# Patient Record
Sex: MALE | Race: WHITE | NOT HISPANIC OR LATINO | Employment: OTHER | URBAN - NONMETROPOLITAN AREA
[De-identification: names, ages, dates, MRNs, and addresses within clinical notes are randomized per-mention and may not be internally consistent; named-entity substitution may affect disease eponyms.]

---

## 2017-02-06 ENCOUNTER — HOSPITAL ENCOUNTER (OUTPATIENT)
Dept: GENERAL RADIOLOGY | Facility: HOSPITAL | Age: 52
Discharge: HOME | End: 2017-02-06
Admitting: INTERNAL MEDICINE

## 2017-02-06 ENCOUNTER — TRANSCRIBE ORDERS (OUTPATIENT)
Dept: ADMINISTRATIVE | Facility: HOSPITAL | Age: 52
End: 2017-02-06

## 2017-02-06 DIAGNOSIS — R07.9 CHEST PAIN, UNSPECIFIED TYPE: Primary | ICD-10-CM

## 2017-02-06 PROCEDURE — 71130 X-RAY STRENOCLAVIC JT 3/>VWS: CPT

## 2017-03-08 PROBLEM — M15.0 PRIMARY GENERALIZED (OSTEO)ARTHRITIS: Status: ACTIVE | Noted: 2017-03-08

## 2017-03-08 PROBLEM — M31.6 GIANT CELL ARTERITIS (HCC): Status: ACTIVE | Noted: 2017-03-08

## 2017-03-08 PROBLEM — M06.4 INFLAMMATORY POLYARTHROPATHY (HCC): Status: ACTIVE | Noted: 2017-03-08

## 2017-03-08 PROBLEM — I89.0 LYMPHEDEMA: Status: ACTIVE | Noted: 2017-03-08

## 2017-03-08 RX ORDER — SODIUM CHLORIDE 9 MG/ML
250 INJECTION, SOLUTION INTRAVENOUS ONCE
Status: CANCELLED | OUTPATIENT
Start: 2017-03-10

## 2017-03-08 RX ORDER — ACETAMINOPHEN 500 MG
1000 TABLET ORAL ONCE
Status: CANCELLED | OUTPATIENT
Start: 2017-03-10

## 2017-03-08 RX ORDER — DIPHENHYDRAMINE HCL 25 MG
25 CAPSULE ORAL ONCE
Status: CANCELLED | OUTPATIENT
Start: 2017-06-16

## 2017-03-08 RX ORDER — ACETAMINOPHEN 500 MG
1000 TABLET ORAL ONCE
Status: CANCELLED | OUTPATIENT
Start: 2017-06-16

## 2017-03-08 RX ORDER — SODIUM CHLORIDE 9 MG/ML
250 INJECTION, SOLUTION INTRAVENOUS ONCE
Status: CANCELLED | OUTPATIENT
Start: 2017-06-16

## 2017-03-08 RX ORDER — DIPHENHYDRAMINE HCL 25 MG
25 CAPSULE ORAL ONCE
Status: CANCELLED | OUTPATIENT
Start: 2017-03-10

## 2017-03-10 ENCOUNTER — HOSPITAL ENCOUNTER (OUTPATIENT)
Dept: INFUSION THERAPY | Facility: HOSPITAL | Age: 52
Setting detail: INFUSION SERIES
Discharge: HOME | End: 2017-03-10

## 2017-03-16 ENCOUNTER — APPOINTMENT (OUTPATIENT)
Dept: INFUSION THERAPY | Facility: HOSPITAL | Age: 52
End: 2017-03-16

## 2017-03-27 ENCOUNTER — HOSPITAL ENCOUNTER (OUTPATIENT)
Dept: INFUSION THERAPY | Facility: HOSPITAL | Age: 52
Setting detail: INFUSION SERIES
Discharge: HOME | End: 2017-03-27

## 2017-03-27 VITALS
BODY MASS INDEX: 39.99 KG/M2 | DIASTOLIC BLOOD PRESSURE: 64 MMHG | HEART RATE: 74 BPM | SYSTOLIC BLOOD PRESSURE: 112 MMHG | TEMPERATURE: 96.9 F | OXYGEN SATURATION: 98 % | HEIGHT: 69 IN | RESPIRATION RATE: 20 BRPM | WEIGHT: 270 LBS

## 2017-03-27 DIAGNOSIS — I89.0 LYMPHEDEMA: ICD-10-CM

## 2017-03-27 DIAGNOSIS — M31.6 GIANT CELL ARTERITIS (HCC): ICD-10-CM

## 2017-03-27 DIAGNOSIS — M06.4 INFLAMMATORY POLYARTHROPATHY (HCC): ICD-10-CM

## 2017-03-27 DIAGNOSIS — M15.0 PRIMARY GENERALIZED (OSTEO)ARTHRITIS: ICD-10-CM

## 2017-03-27 PROCEDURE — 96415 CHEMO IV INFUSION ADDL HR: CPT

## 2017-03-27 PROCEDURE — 63710000001 DIPHENHYDRAMINE PER 50 MG: Performed by: INTERNAL MEDICINE

## 2017-03-27 PROCEDURE — 96413 CHEMO IV INFUSION 1 HR: CPT

## 2017-03-27 PROCEDURE — 25010000002 INFLIXIMAB PER 10 MG: Performed by: INTERNAL MEDICINE

## 2017-03-27 RX ORDER — PREDNISONE 10 MG/1
10 TABLET ORAL DAILY
COMMUNITY

## 2017-03-27 RX ORDER — METHOTREXATE 25 MG/ML
50 INJECTION INTRA-ARTERIAL; INTRAMUSCULAR; INTRATHECAL; INTRAVENOUS WEEKLY
COMMUNITY

## 2017-03-27 RX ORDER — ONDANSETRON HYDROCHLORIDE 8 MG/1
TABLET, FILM COATED ORAL EVERY 12 HOURS PRN
COMMUNITY

## 2017-03-27 RX ORDER — SUCRALFATE 1 G/1
1 TABLET ORAL 4 TIMES DAILY
COMMUNITY

## 2017-03-27 RX ORDER — IBUPROFEN 400 MG/1
400 TABLET ORAL EVERY 6 HOURS PRN
COMMUNITY

## 2017-03-27 RX ORDER — ACETAMINOPHEN 500 MG
1000 TABLET ORAL ONCE
Status: COMPLETED | OUTPATIENT
Start: 2017-03-27 | End: 2017-03-27

## 2017-03-27 RX ORDER — SODIUM CHLORIDE 9 MG/ML
250 INJECTION, SOLUTION INTRAVENOUS ONCE
Status: CANCELLED | OUTPATIENT
Start: 2017-04-07

## 2017-03-27 RX ORDER — SODIUM CHLORIDE 9 MG/ML
250 INJECTION, SOLUTION INTRAVENOUS ONCE
Status: COMPLETED | OUTPATIENT
Start: 2017-03-27 | End: 2017-03-27

## 2017-03-27 RX ORDER — DIPHENHYDRAMINE HCL 25 MG
25 CAPSULE ORAL ONCE
Status: CANCELLED | OUTPATIENT
Start: 2017-04-07

## 2017-03-27 RX ORDER — DIPHENHYDRAMINE HCL 25 MG
25 CAPSULE ORAL ONCE
Status: COMPLETED | OUTPATIENT
Start: 2017-03-27 | End: 2017-03-27

## 2017-03-27 RX ORDER — SERTRALINE HYDROCHLORIDE 25 MG/1
25 TABLET, FILM COATED ORAL DAILY
COMMUNITY

## 2017-03-27 RX ORDER — ACETAMINOPHEN 500 MG
1000 TABLET ORAL ONCE
Status: CANCELLED | OUTPATIENT
Start: 2017-04-07

## 2017-03-27 RX ORDER — OMEPRAZOLE 20 MG/1
20 CAPSULE, DELAYED RELEASE ORAL 2 TIMES DAILY
COMMUNITY

## 2017-03-27 RX ORDER — POTASSIUM CHLORIDE 750 MG/1
20 TABLET, EXTENDED RELEASE ORAL 2 TIMES DAILY
COMMUNITY

## 2017-03-27 RX ORDER — MAGNESIUM OXIDE 400 MG/1
400 TABLET ORAL 2 TIMES DAILY
COMMUNITY

## 2017-03-27 RX ORDER — ASPIRIN 81 MG/1
81 TABLET ORAL DAILY
COMMUNITY

## 2017-03-27 RX ORDER — ELETRIPTAN HYDROBROMIDE 40 MG/1
40 TABLET, FILM COATED ORAL ONCE AS NEEDED
COMMUNITY

## 2017-03-27 RX ORDER — TRAZODONE HYDROCHLORIDE 150 MG/1
300 TABLET ORAL
COMMUNITY

## 2017-03-27 RX ORDER — FOLIC ACID 1 MG/1
1 TABLET ORAL DAILY
COMMUNITY

## 2017-03-27 RX ADMIN — SODIUM CHLORIDE 250 ML: 9 INJECTION, SOLUTION INTRAVENOUS at 10:47

## 2017-03-27 RX ADMIN — ACETAMINOPHEN 1000 MG: 500 TABLET, COATED ORAL at 10:08

## 2017-03-27 RX ADMIN — DIPHENHYDRAMINE HYDROCHLORIDE 25 MG: 25 CAPSULE ORAL at 10:08

## 2017-03-27 RX ADMIN — INFLIXIMAB 400 MG: 100 INJECTION, POWDER, LYOPHILIZED, FOR SOLUTION INTRAVENOUS at 10:48

## 2017-03-27 NOTE — PATIENT INSTRUCTIONS
Post Rheumatoid Medications:    Please notify your doctor if you are sick or you have an infection.    Make sure that you call your Rheumatoid Doctor if you plan to have an upcoming surgery.    Call the doctor if you have changes in your medications.    Call your doctor if you are on antibiotics or have a fever.  You will need to reschedule your appointment by calling [phone].    Avoid taking any LIVE vaccines.    Post medications reactions:     Possible reactions:      Itching or rash    Headache    Flu-like symptoms of fever, sore throat or joint aches    You may use Tylenol and Benadryl if not contraindicated by your doctor.    Remember that Benadryl makes you sleepy.  Use cautiously.    Severe reaction: You will need to call 911 or come to the Emergency Room.     Swelling of the throat or neck   Chest tightness   Shortness of breath   Difficulty breathing   Wheezing   Drop in your blood pressure with symptoms of weakness or dizziness

## 2017-04-11 ENCOUNTER — HOSPITAL ENCOUNTER (OUTPATIENT)
Dept: INFUSION THERAPY | Facility: HOSPITAL | Age: 52
Setting detail: INFUSION SERIES
Discharge: HOME | End: 2017-04-11

## 2017-04-11 VITALS
WEIGHT: 260 LBS | HEIGHT: 69 IN | RESPIRATION RATE: 20 BRPM | SYSTOLIC BLOOD PRESSURE: 117 MMHG | TEMPERATURE: 97.6 F | OXYGEN SATURATION: 97 % | DIASTOLIC BLOOD PRESSURE: 69 MMHG | BODY MASS INDEX: 38.51 KG/M2 | HEART RATE: 72 BPM

## 2017-04-11 DIAGNOSIS — M31.6 GIANT CELL ARTERITIS (HCC): ICD-10-CM

## 2017-04-11 DIAGNOSIS — I89.0 LYMPHEDEMA: ICD-10-CM

## 2017-04-11 DIAGNOSIS — M06.4 INFLAMMATORY POLYARTHROPATHY (HCC): ICD-10-CM

## 2017-04-11 DIAGNOSIS — M15.0 PRIMARY GENERALIZED (OSTEO)ARTHRITIS: ICD-10-CM

## 2017-04-11 PROCEDURE — 25010000002 INFLIXIMAB PER 10 MG: Performed by: INTERNAL MEDICINE

## 2017-04-11 PROCEDURE — 96413 CHEMO IV INFUSION 1 HR: CPT

## 2017-04-11 PROCEDURE — 96415 CHEMO IV INFUSION ADDL HR: CPT

## 2017-04-11 PROCEDURE — 63710000001 DIPHENHYDRAMINE PER 50 MG: Performed by: INTERNAL MEDICINE

## 2017-04-11 RX ORDER — ACETAMINOPHEN 500 MG
1000 TABLET ORAL ONCE
Status: CANCELLED | OUTPATIENT
Start: 2017-04-24

## 2017-04-11 RX ORDER — DIPHENHYDRAMINE HCL 25 MG
25 CAPSULE ORAL ONCE
Status: COMPLETED | OUTPATIENT
Start: 2017-04-11 | End: 2017-04-11

## 2017-04-11 RX ORDER — DIPHENHYDRAMINE HCL 25 MG
25 CAPSULE ORAL ONCE
Status: CANCELLED | OUTPATIENT
Start: 2017-04-24

## 2017-04-11 RX ORDER — SODIUM CHLORIDE 9 MG/ML
250 INJECTION, SOLUTION INTRAVENOUS ONCE
Status: COMPLETED | OUTPATIENT
Start: 2017-04-11 | End: 2017-04-11

## 2017-04-11 RX ORDER — SODIUM CHLORIDE 9 MG/ML
250 INJECTION, SOLUTION INTRAVENOUS ONCE
Status: CANCELLED | OUTPATIENT
Start: 2017-04-24

## 2017-04-11 RX ORDER — ACETAMINOPHEN 500 MG
1000 TABLET ORAL ONCE
Status: COMPLETED | OUTPATIENT
Start: 2017-04-11 | End: 2017-04-11

## 2017-04-11 RX ADMIN — INFLIXIMAB 400 MG: 100 INJECTION, POWDER, LYOPHILIZED, FOR SOLUTION INTRAVENOUS at 08:21

## 2017-04-11 RX ADMIN — DIPHENHYDRAMINE HYDROCHLORIDE 25 MG: 25 CAPSULE ORAL at 07:52

## 2017-04-11 RX ADMIN — SODIUM CHLORIDE 250 ML: 9 INJECTION, SOLUTION INTRAVENOUS at 08:20

## 2017-04-11 RX ADMIN — ACETAMINOPHEN 1000 MG: 500 TABLET, COATED ORAL at 07:51

## 2017-04-11 NOTE — PATIENT INSTRUCTIONS
Post Rheumatoid Medications:    Please notify your doctor if you are sick or you have an infection.    Make sure that you call your Rheumatoid Doctor if you plan to have an upcoming surgery.    Call the doctor if you have changes in your medications.    Call your doctor if you are on antibiotics or have a fever.  You will need to reschedule your appointment by calling [phone].    Avoid taking any LIVE vaccines.    Post medications reactions:     Possible reactions:      Itching or rash    Headache    Flu-like symptoms of fever, sore throat or joint aches    You may use Tylenol and Benadryl if not contraindicated by your doctor.    Remember that Benadryl makes you sleepy.  Use cautiously.    Severe reaction: You will need to call 911 or come to the Emergency Room.     Swelling of the throat or neck   Chest tightness   Shortness of breath   Difficulty breathing   Wheezing   Drop in your blood pressure with symptoms of weakness or dizziness

## 2017-05-04 ENCOUNTER — HOSPITAL ENCOUNTER (EMERGENCY)
Facility: HOSPITAL | Age: 52
Discharge: HOME | End: 2017-05-04
Attending: EMERGENCY MEDICINE | Admitting: EMERGENCY MEDICINE

## 2017-05-04 ENCOUNTER — APPOINTMENT (OUTPATIENT)
Dept: GENERAL RADIOLOGY | Facility: HOSPITAL | Age: 52
End: 2017-05-04

## 2017-05-04 VITALS
HEART RATE: 60 BPM | OXYGEN SATURATION: 97 % | RESPIRATION RATE: 18 BRPM | BODY MASS INDEX: 37.77 KG/M2 | WEIGHT: 255 LBS | SYSTOLIC BLOOD PRESSURE: 122 MMHG | TEMPERATURE: 98.1 F | DIASTOLIC BLOOD PRESSURE: 80 MMHG | HEIGHT: 69 IN

## 2017-05-04 DIAGNOSIS — R07.9 CHEST PAIN, UNSPECIFIED TYPE: Primary | ICD-10-CM

## 2017-05-04 LAB
ALBUMIN SERPL-MCNC: 4.1 G/DL (ref 3.5–5)
ALBUMIN/GLOB SERPL: 1.2 G/DL (ref 1–2)
ALP SERPL-CCNC: 76 U/L (ref 38–126)
ALT SERPL W P-5'-P-CCNC: 41 U/L (ref 13–69)
ANION GAP SERPL CALCULATED.3IONS-SCNC: 15.1 MMOL/L
AST SERPL-CCNC: 28 U/L (ref 15–46)
BASOPHILS # BLD AUTO: 0.02 10*3/MM3 (ref 0–0.2)
BASOPHILS NFR BLD AUTO: 0.3 % (ref 0–2.5)
BILIRUB SERPL-MCNC: 0.5 MG/DL (ref 0.2–1.3)
BUN BLD-MCNC: 20 MG/DL (ref 7–20)
BUN/CREAT SERPL: 25 (ref 6.3–21.9)
CALCIUM SPEC-SCNC: 9.2 MG/DL (ref 8.4–10.2)
CHLORIDE SERPL-SCNC: 110 MMOL/L (ref 98–107)
CK MB SERPL-CCNC: 0.71 NG/ML (ref 0.2–2.4)
CK MB SERPL-CCNC: 1.04 NG/ML (ref 0.2–2.4)
CK MB SERPL-RTO: 1.2 % (ref 0–2)
CK MB SERPL-RTO: 1.7 % (ref 0–2)
CK SERPL-CCNC: 59 U/L (ref 30–170)
CK SERPL-CCNC: 61 U/L (ref 30–170)
CO2 SERPL-SCNC: 23 MMOL/L (ref 26–30)
CREAT BLD-MCNC: 0.8 MG/DL (ref 0.6–1.3)
DEPRECATED RDW RBC AUTO: 49.1 FL (ref 37–54)
EOSINOPHIL # BLD AUTO: 0.08 10*3/MM3 (ref 0–0.7)
EOSINOPHIL NFR BLD AUTO: 1.2 % (ref 0–7)
ERYTHROCYTE [DISTWIDTH] IN BLOOD BY AUTOMATED COUNT: 13.8 % (ref 11.5–14.5)
GFR SERPL CREATININE-BSD FRML MDRD: 102 ML/MIN/1.73
GLOBULIN UR ELPH-MCNC: 3.3 GM/DL
GLUCOSE BLD-MCNC: 76 MG/DL (ref 74–98)
HCT VFR BLD AUTO: 42.5 % (ref 42–52)
HGB BLD-MCNC: 13.9 G/DL (ref 14–18)
HOLD SPECIMEN: NORMAL
HOLD SPECIMEN: NORMAL
IMM GRANULOCYTES # BLD: 0.01 10*3/MM3 (ref 0–0.06)
IMM GRANULOCYTES NFR BLD: 0.1 % (ref 0–0.6)
LYMPHOCYTES # BLD AUTO: 1.34 10*3/MM3 (ref 0.6–3.4)
LYMPHOCYTES NFR BLD AUTO: 19.6 % (ref 10–50)
MAGNESIUM SERPL-MCNC: 1.9 MG/DL (ref 1.6–2.3)
MCH RBC QN AUTO: 31.7 PG (ref 27–31)
MCHC RBC AUTO-ENTMCNC: 32.7 G/DL (ref 30–37)
MCV RBC AUTO: 96.8 FL (ref 80–94)
MONOCYTES # BLD AUTO: 0.68 10*3/MM3 (ref 0–0.9)
MONOCYTES NFR BLD AUTO: 9.9 % (ref 0–12)
NEUTROPHILS # BLD AUTO: 4.71 10*3/MM3 (ref 2–6.9)
NEUTROPHILS NFR BLD AUTO: 68.9 % (ref 37–80)
NRBC BLD MANUAL-RTO: 0 /100 WBC (ref 0–0)
PLATELET # BLD AUTO: 155 10*3/MM3 (ref 130–400)
PMV BLD AUTO: 12.2 FL (ref 6–12)
POTASSIUM BLD-SCNC: 4.1 MMOL/L (ref 3.5–5.1)
PROT SERPL-MCNC: 7.4 G/DL (ref 6.3–8.2)
RBC # BLD AUTO: 4.39 10*6/MM3 (ref 4.7–6.1)
SODIUM BLD-SCNC: 144 MMOL/L (ref 137–145)
TROPONIN I SERPL-MCNC: 0 NG/ML (ref 0–0.05)
TROPONIN I SERPL-MCNC: <0.012 NG/ML (ref 0–0.03)
TROPONIN I SERPL-MCNC: <0.012 NG/ML (ref 0–0.03)
WBC NRBC COR # BLD: 6.84 10*3/MM3 (ref 4.8–10.8)
WHOLE BLOOD HOLD SPECIMEN: NORMAL
WHOLE BLOOD HOLD SPECIMEN: NORMAL

## 2017-05-04 PROCEDURE — 93005 ELECTROCARDIOGRAM TRACING: CPT | Performed by: EMERGENCY MEDICINE

## 2017-05-04 PROCEDURE — 80053 COMPREHEN METABOLIC PANEL: CPT | Performed by: EMERGENCY MEDICINE

## 2017-05-04 PROCEDURE — 82553 CREATINE MB FRACTION: CPT | Performed by: EMERGENCY MEDICINE

## 2017-05-04 PROCEDURE — 82550 ASSAY OF CK (CPK): CPT | Performed by: EMERGENCY MEDICINE

## 2017-05-04 PROCEDURE — 84484 ASSAY OF TROPONIN QUANT: CPT | Performed by: EMERGENCY MEDICINE

## 2017-05-04 PROCEDURE — 85025 COMPLETE CBC W/AUTO DIFF WBC: CPT | Performed by: EMERGENCY MEDICINE

## 2017-05-04 PROCEDURE — 25010000002 MORPHINE PER 10 MG: Performed by: EMERGENCY MEDICINE

## 2017-05-04 PROCEDURE — 25010000002 ONDANSETRON PER 1 MG: Performed by: EMERGENCY MEDICINE

## 2017-05-04 PROCEDURE — 96374 THER/PROPH/DIAG INJ IV PUSH: CPT

## 2017-05-04 PROCEDURE — 99284 EMERGENCY DEPT VISIT MOD MDM: CPT

## 2017-05-04 PROCEDURE — 71010 HC CHEST PA OR AP: CPT

## 2017-05-04 PROCEDURE — 83735 ASSAY OF MAGNESIUM: CPT | Performed by: EMERGENCY MEDICINE

## 2017-05-04 PROCEDURE — 96375 TX/PRO/DX INJ NEW DRUG ADDON: CPT

## 2017-05-04 PROCEDURE — 96361 HYDRATE IV INFUSION ADD-ON: CPT

## 2017-05-04 RX ORDER — ALUMINA, MAGNESIA, AND SIMETHICONE 2400; 2400; 240 MG/30ML; MG/30ML; MG/30ML
15 SUSPENSION ORAL ONCE
Status: COMPLETED | OUTPATIENT
Start: 2017-05-04 | End: 2017-05-04

## 2017-05-04 RX ORDER — SODIUM CHLORIDE 0.9 % (FLUSH) 0.9 %
10 SYRINGE (ML) INJECTION AS NEEDED
Status: DISCONTINUED | OUTPATIENT
Start: 2017-05-04 | End: 2017-05-04 | Stop reason: HOSPADM

## 2017-05-04 RX ORDER — SODIUM CHLORIDE 9 MG/ML
125 INJECTION, SOLUTION INTRAVENOUS CONTINUOUS
Status: DISCONTINUED | OUTPATIENT
Start: 2017-05-04 | End: 2017-05-04 | Stop reason: HOSPADM

## 2017-05-04 RX ORDER — ONDANSETRON 2 MG/ML
4 INJECTION INTRAMUSCULAR; INTRAVENOUS ONCE
Status: COMPLETED | OUTPATIENT
Start: 2017-05-04 | End: 2017-05-04

## 2017-05-04 RX ORDER — MORPHINE SULFATE 4 MG/ML
4 INJECTION, SOLUTION INTRAMUSCULAR; INTRAVENOUS ONCE
Status: COMPLETED | OUTPATIENT
Start: 2017-05-04 | End: 2017-05-04

## 2017-05-04 RX ORDER — ASPIRIN 325 MG
325 TABLET ORAL ONCE
Status: COMPLETED | OUTPATIENT
Start: 2017-05-04 | End: 2017-05-04

## 2017-05-04 RX ORDER — FAMOTIDINE 10 MG/ML
20 INJECTION, SOLUTION INTRAVENOUS ONCE
Status: COMPLETED | OUTPATIENT
Start: 2017-05-04 | End: 2017-05-04

## 2017-05-04 RX ADMIN — LIDOCAINE HYDROCHLORIDE 15 ML: 20 SOLUTION ORAL; TOPICAL at 18:00

## 2017-05-04 RX ADMIN — ONDANSETRON 4 MG: 2 INJECTION INTRAMUSCULAR; INTRAVENOUS at 16:28

## 2017-05-04 RX ADMIN — SODIUM CHLORIDE 125 ML/HR: 9 INJECTION, SOLUTION INTRAVENOUS at 16:28

## 2017-05-04 RX ADMIN — FAMOTIDINE 20 MG: 10 INJECTION, SOLUTION INTRAVENOUS at 18:00

## 2017-05-04 RX ADMIN — ALUMINUM HYDROXIDE, MAGNESIUM HYDROXIDE, AND DIMETHICONE 15 ML: 400; 400; 40 SUSPENSION ORAL at 18:00

## 2017-05-04 RX ADMIN — ASPIRIN 325 MG ORAL TABLET 325 MG: 325 PILL ORAL at 16:24

## 2017-05-04 RX ADMIN — MORPHINE SULFATE 4 MG: 4 INJECTION, SOLUTION INTRAMUSCULAR; INTRAVENOUS at 16:28

## 2017-05-16 ENCOUNTER — APPOINTMENT (OUTPATIENT)
Dept: INFUSION THERAPY | Facility: HOSPITAL | Age: 52
End: 2017-05-16

## 2017-05-24 ENCOUNTER — HOSPITAL ENCOUNTER (OUTPATIENT)
Dept: INFUSION THERAPY | Facility: HOSPITAL | Age: 52
Setting detail: INFUSION SERIES
Discharge: HOME | End: 2017-05-24

## 2017-05-24 VITALS
RESPIRATION RATE: 18 BRPM | TEMPERATURE: 96.9 F | HEART RATE: 66 BPM | HEIGHT: 69 IN | SYSTOLIC BLOOD PRESSURE: 123 MMHG | WEIGHT: 273 LBS | DIASTOLIC BLOOD PRESSURE: 76 MMHG | BODY MASS INDEX: 40.43 KG/M2 | OXYGEN SATURATION: 97 %

## 2017-05-24 DIAGNOSIS — M15.0 PRIMARY GENERALIZED (OSTEO)ARTHRITIS: ICD-10-CM

## 2017-05-24 DIAGNOSIS — I89.0 LYMPHEDEMA: ICD-10-CM

## 2017-05-24 DIAGNOSIS — M06.4 INFLAMMATORY POLYARTHROPATHY (HCC): ICD-10-CM

## 2017-05-24 DIAGNOSIS — M31.6 GIANT CELL ARTERITIS (HCC): ICD-10-CM

## 2017-05-24 PROCEDURE — 96413 CHEMO IV INFUSION 1 HR: CPT

## 2017-05-24 PROCEDURE — 96415 CHEMO IV INFUSION ADDL HR: CPT

## 2017-05-24 PROCEDURE — 25010000002 INFLIXIMAB PER 10 MG: Performed by: INTERNAL MEDICINE

## 2017-05-24 PROCEDURE — 63710000001 DIPHENHYDRAMINE PER 50 MG: Performed by: INTERNAL MEDICINE

## 2017-05-24 RX ORDER — SODIUM CHLORIDE 9 MG/ML
250 INJECTION, SOLUTION INTRAVENOUS ONCE
Status: CANCELLED | OUTPATIENT
Start: 2017-05-24

## 2017-05-24 RX ORDER — ACETAMINOPHEN 500 MG
1000 TABLET ORAL ONCE
Status: COMPLETED | OUTPATIENT
Start: 2017-05-24 | End: 2017-05-24

## 2017-05-24 RX ORDER — ACETAMINOPHEN 500 MG
1000 TABLET ORAL ONCE
Status: CANCELLED | OUTPATIENT
Start: 2017-05-24

## 2017-05-24 RX ORDER — SODIUM CHLORIDE 9 MG/ML
250 INJECTION, SOLUTION INTRAVENOUS ONCE
Status: COMPLETED | OUTPATIENT
Start: 2017-05-24 | End: 2017-05-24

## 2017-05-24 RX ORDER — DIPHENHYDRAMINE HCL 25 MG
25 CAPSULE ORAL ONCE
Status: COMPLETED | OUTPATIENT
Start: 2017-05-24 | End: 2017-05-24

## 2017-05-24 RX ORDER — DIPHENHYDRAMINE HCL 25 MG
25 CAPSULE ORAL ONCE
Status: CANCELLED | OUTPATIENT
Start: 2017-05-24

## 2017-05-24 RX ADMIN — INFLIXIMAB 400 MG: 100 INJECTION, POWDER, LYOPHILIZED, FOR SOLUTION INTRAVENOUS at 08:59

## 2017-05-24 RX ADMIN — DIPHENHYDRAMINE HYDROCHLORIDE 25 MG: 25 CAPSULE ORAL at 08:24

## 2017-05-24 RX ADMIN — SODIUM CHLORIDE 250 ML: 9 INJECTION, SOLUTION INTRAVENOUS at 08:59

## 2017-05-24 RX ADMIN — ACETAMINOPHEN 1000 MG: 500 TABLET, FILM COATED ORAL at 08:23

## 2017-07-06 ENCOUNTER — APPOINTMENT (OUTPATIENT)
Dept: CT IMAGING | Facility: HOSPITAL | Age: 52
End: 2017-07-06

## 2017-07-06 ENCOUNTER — HOSPITAL ENCOUNTER (EMERGENCY)
Facility: HOSPITAL | Age: 52
Discharge: HOME | End: 2017-07-07
Attending: EMERGENCY MEDICINE | Admitting: EMERGENCY MEDICINE

## 2017-07-06 DIAGNOSIS — R11.2 NON-INTRACTABLE VOMITING WITH NAUSEA, UNSPECIFIED VOMITING TYPE: ICD-10-CM

## 2017-07-06 DIAGNOSIS — R10.9 ABDOMINAL PAIN, UNSPECIFIED LOCATION: Primary | ICD-10-CM

## 2017-07-06 LAB
BASOPHILS # BLD AUTO: 0.02 10*3/MM3 (ref 0–0.2)
BASOPHILS NFR BLD AUTO: 0.2 % (ref 0–2.5)
DEPRECATED RDW RBC AUTO: 49.6 FL (ref 37–54)
EOSINOPHIL # BLD AUTO: 0.22 10*3/MM3 (ref 0–0.7)
EOSINOPHIL NFR BLD AUTO: 2.2 % (ref 0–7)
ERYTHROCYTE [DISTWIDTH] IN BLOOD BY AUTOMATED COUNT: 14.6 % (ref 11.5–14.5)
HCT VFR BLD AUTO: 40.3 % (ref 42–52)
HGB BLD-MCNC: 13.3 G/DL (ref 14–18)
IMM GRANULOCYTES # BLD: 0.05 10*3/MM3 (ref 0–0.06)
IMM GRANULOCYTES NFR BLD: 0.5 % (ref 0–0.6)
LYMPHOCYTES # BLD AUTO: 2.05 10*3/MM3 (ref 0.6–3.4)
LYMPHOCYTES NFR BLD AUTO: 20.6 % (ref 10–50)
MCH RBC QN AUTO: 31.4 PG (ref 27–31)
MCHC RBC AUTO-ENTMCNC: 33 G/DL (ref 30–37)
MCV RBC AUTO: 95.3 FL (ref 80–94)
MONOCYTES # BLD AUTO: 1.03 10*3/MM3 (ref 0–0.9)
MONOCYTES NFR BLD AUTO: 10.3 % (ref 0–12)
NEUTROPHILS # BLD AUTO: 6.59 10*3/MM3 (ref 2–6.9)
NEUTROPHILS NFR BLD AUTO: 66.2 % (ref 37–80)
NRBC BLD MANUAL-RTO: 0 /100 WBC (ref 0–0)
PLATELET # BLD AUTO: 181 10*3/MM3 (ref 130–400)
PMV BLD AUTO: 11.8 FL (ref 6–12)
RBC # BLD AUTO: 4.23 10*6/MM3 (ref 4.7–6.1)
WBC NRBC COR # BLD: 9.96 10*3/MM3 (ref 4.8–10.8)

## 2017-07-06 PROCEDURE — 96361 HYDRATE IV INFUSION ADD-ON: CPT

## 2017-07-06 PROCEDURE — 85025 COMPLETE CBC W/AUTO DIFF WBC: CPT | Performed by: PHYSICIAN ASSISTANT

## 2017-07-06 PROCEDURE — 99283 EMERGENCY DEPT VISIT LOW MDM: CPT

## 2017-07-06 PROCEDURE — 81003 URINALYSIS AUTO W/O SCOPE: CPT | Performed by: PHYSICIAN ASSISTANT

## 2017-07-06 PROCEDURE — 80053 COMPREHEN METABOLIC PANEL: CPT | Performed by: PHYSICIAN ASSISTANT

## 2017-07-06 PROCEDURE — 96374 THER/PROPH/DIAG INJ IV PUSH: CPT

## 2017-07-06 PROCEDURE — 25010000002 ONDANSETRON PER 1 MG: Performed by: PHYSICIAN ASSISTANT

## 2017-07-06 PROCEDURE — 83690 ASSAY OF LIPASE: CPT | Performed by: PHYSICIAN ASSISTANT

## 2017-07-06 PROCEDURE — 74177 CT ABD & PELVIS W/CONTRAST: CPT

## 2017-07-06 RX ORDER — ONDANSETRON 2 MG/ML
4 INJECTION INTRAMUSCULAR; INTRAVENOUS ONCE
Status: COMPLETED | OUTPATIENT
Start: 2017-07-06 | End: 2017-07-06

## 2017-07-06 RX ORDER — SODIUM CHLORIDE 0.9 % (FLUSH) 0.9 %
10 SYRINGE (ML) INJECTION AS NEEDED
Status: DISCONTINUED | OUTPATIENT
Start: 2017-07-06 | End: 2017-07-07 | Stop reason: HOSPADM

## 2017-07-06 RX ADMIN — SODIUM CHLORIDE 1000 ML: 9 INJECTION, SOLUTION INTRAVENOUS at 23:42

## 2017-07-06 RX ADMIN — ONDANSETRON 4 MG: 2 INJECTION INTRAMUSCULAR; INTRAVENOUS at 23:42

## 2017-07-07 ENCOUNTER — APPOINTMENT (OUTPATIENT)
Dept: GENERAL RADIOLOGY | Facility: HOSPITAL | Age: 52
End: 2017-07-07

## 2017-07-07 VITALS
HEIGHT: 69 IN | RESPIRATION RATE: 20 BRPM | DIASTOLIC BLOOD PRESSURE: 75 MMHG | SYSTOLIC BLOOD PRESSURE: 114 MMHG | OXYGEN SATURATION: 94 % | WEIGHT: 270 LBS | HEART RATE: 88 BPM | BODY MASS INDEX: 39.99 KG/M2 | TEMPERATURE: 97.4 F

## 2017-07-07 VITALS
RESPIRATION RATE: 18 BRPM | HEIGHT: 69 IN | TEMPERATURE: 97.7 F | WEIGHT: 270 LBS | SYSTOLIC BLOOD PRESSURE: 123 MMHG | OXYGEN SATURATION: 95 % | HEART RATE: 83 BPM | DIASTOLIC BLOOD PRESSURE: 72 MMHG | BODY MASS INDEX: 39.99 KG/M2

## 2017-07-07 DIAGNOSIS — S20.212A RIB CONTUSION, LEFT, INITIAL ENCOUNTER: Primary | ICD-10-CM

## 2017-07-07 LAB
ALBUMIN SERPL-MCNC: 4 G/DL (ref 3.5–5)
ALBUMIN/GLOB SERPL: 1.4 G/DL (ref 1–2)
ALP SERPL-CCNC: 89 U/L (ref 38–126)
ALT SERPL W P-5'-P-CCNC: 39 U/L (ref 13–69)
ANION GAP SERPL CALCULATED.3IONS-SCNC: 12.8 MMOL/L
AST SERPL-CCNC: 31 U/L (ref 15–46)
BILIRUB SERPL-MCNC: 0.7 MG/DL (ref 0.2–1.3)
BILIRUB UR QL STRIP: NEGATIVE
BUN BLD-MCNC: 21 MG/DL (ref 7–20)
BUN/CREAT SERPL: 30 (ref 6.3–21.9)
CALCIUM SPEC-SCNC: 9 MG/DL (ref 8.4–10.2)
CHLORIDE SERPL-SCNC: 107 MMOL/L (ref 98–107)
CLARITY UR: CLEAR
CO2 SERPL-SCNC: 22 MMOL/L (ref 26–30)
COLOR UR: YELLOW
CREAT BLD-MCNC: 0.7 MG/DL (ref 0.6–1.3)
GFR SERPL CREATININE-BSD FRML MDRD: 119 ML/MIN/1.73
GLOBULIN UR ELPH-MCNC: 2.9 GM/DL
GLUCOSE BLD-MCNC: 74 MG/DL (ref 74–98)
GLUCOSE UR STRIP-MCNC: NEGATIVE MG/DL
HGB UR QL STRIP.AUTO: NEGATIVE
KETONES UR QL STRIP: NEGATIVE
LEUKOCYTE ESTERASE UR QL STRIP.AUTO: NEGATIVE
LIPASE SERPL-CCNC: 227 U/L (ref 23–300)
NITRITE UR QL STRIP: NEGATIVE
PH UR STRIP.AUTO: 5.5 [PH] (ref 5–8)
POTASSIUM BLD-SCNC: 3.8 MMOL/L (ref 3.5–5.1)
PROT SERPL-MCNC: 6.9 G/DL (ref 6.3–8.2)
PROT UR QL STRIP: NEGATIVE
SODIUM BLD-SCNC: 138 MMOL/L (ref 137–145)
SP GR UR STRIP: 1.01 (ref 1–1.03)
UROBILINOGEN UR QL STRIP: NORMAL

## 2017-07-07 PROCEDURE — 71101 X-RAY EXAM UNILAT RIBS/CHEST: CPT

## 2017-07-07 PROCEDURE — 99283 EMERGENCY DEPT VISIT LOW MDM: CPT

## 2017-07-07 PROCEDURE — 0 IOPAMIDOL 61 % SOLUTION: Performed by: EMERGENCY MEDICINE

## 2017-07-07 RX ADMIN — IOPAMIDOL 95 ML: 612 INJECTION, SOLUTION INTRAVENOUS at 00:35

## 2017-07-07 NOTE — ED PROVIDER NOTES
Subjective   History of Present Illness    Review of Systems    Past Medical History:   Diagnosis Date   • Renal calculi    • Renal disorder    • Rheumatoid arthritis        Allergies   Allergen Reactions   • Ciprofloxacin Other (See Comments)     Stated family allergy that almost caused death with siblings and mother   • Vancomycin Hives       Past Surgical History:   Procedure Laterality Date   • CHOLECYSTECTOMY     • EYE MUSCLE SURGERY Bilateral     x3   • GASTRIC BYPASS     • KNEE ARTHROPLASTY Bilateral    • TONSILLECTOMY     • VENTRICULAR ATRIAL SHUNT         History reviewed. No pertinent family history.    Social History     Social History   • Marital status:      Spouse name: N/A   • Number of children: N/A   • Years of education: N/A     Social History Main Topics   • Smoking status: Former Smoker   • Smokeless tobacco: None   • Alcohol use No   • Drug use: No   • Sexual activity: Not Asked     Other Topics Concern   • None     Social History Narrative           Objective   Physical Exam    Procedures         ED Course  ED Course                  MDM  Number of Diagnoses or Management Options  Abdominal pain, unspecified location:   Non-intractable vomiting with nausea, unspecified vomiting type:   Diagnosis management comments: Patient improved.  Abdomen is nonsurgical on exam.  Wife with similar symptoms, likely food poisoning versus viral.  Has Zofran at home.  Discussed abdominal pain precautions, indications for return, need for close follow-up.       Amount and/or Complexity of Data Reviewed  Clinical lab tests: ordered and reviewed  Tests in the radiology section of CPT®: ordered and reviewed        Final diagnoses:   Abdominal pain, unspecified location   Non-intractable vomiting with nausea, unspecified vomiting type            Swapnil Pineda MD  07/07/17 0110

## 2017-07-07 NOTE — ED PROVIDER NOTES
"Subjective   HPI Comments: 52-year-old male presents to the emergency department with left lower rib pain after bending over a chair and feeling a \"pop\" in his left lower ribs.  No shortness of breath.  No abdominal pain.  No past pertinent medical history.  He is a nonsmoker.  No alcohol or drug use.      Patient is a 52 y.o. male presenting with chest pain.   History provided by:  Patient  Chest Pain   Pain location:  L chest  Pain quality: dull    Pain radiates to:  Does not radiate  Pain severity:  Moderate  Onset quality:  Sudden  Duration: this morning.  Timing:  Constant  Progression:  Unchanged  Chronicity:  New  Context: breathing    Relieved by:  Nothing  Worsened by:  Deep breathing and movement  Ineffective treatments:  None tried  Associated symptoms: no abdominal pain, no back pain, no cough, no dizziness, no fever, no headache, no nausea, no palpitations, no shortness of breath, no vomiting and no weakness        Review of Systems   Constitutional: Negative for chills and fever.   HENT: Negative for congestion, ear pain, nosebleeds, rhinorrhea and sore throat.    Eyes: Negative for pain, discharge and visual disturbance.   Respiratory: Negative for cough, shortness of breath and wheezing.    Cardiovascular: Positive for chest pain (left lower ribs). Negative for palpitations and leg swelling.   Gastrointestinal: Negative for abdominal pain, blood in stool, diarrhea, nausea and vomiting.   Endocrine: Negative.    Genitourinary: Negative for dysuria, hematuria and urgency.   Musculoskeletal: Negative for arthralgias and back pain.   Skin: Negative for pallor and rash.   Allergic/Immunologic: Negative for immunocompromised state.   Neurological: Negative for dizziness, speech difficulty, weakness and headaches.   Hematological: Negative for adenopathy. Does not bruise/bleed easily.   Psychiatric/Behavioral: Negative.        Past Medical History:   Diagnosis Date   • Renal calculi    • Renal disorder    • " Rheumatoid arthritis        Allergies   Allergen Reactions   • Ciprofloxacin Other (See Comments)     Stated family allergy that almost caused death with siblings and mother   • Vancomycin Hives       Past Surgical History:   Procedure Laterality Date   • CHOLECYSTECTOMY     • EYE MUSCLE SURGERY Bilateral     x3   • GASTRIC BYPASS     • KNEE ARTHROPLASTY Bilateral    • TONSILLECTOMY     • VENTRICULAR ATRIAL SHUNT         History reviewed. No pertinent family history.    Social History     Social History   • Marital status:      Spouse name: N/A   • Number of children: N/A   • Years of education: N/A     Social History Main Topics   • Smoking status: Former Smoker   • Smokeless tobacco: None   • Alcohol use No   • Drug use: No   • Sexual activity: Not Asked     Other Topics Concern   • None     Social History Narrative           Objective   Physical Exam   Constitutional: He is oriented to person, place, and time. He appears well-developed and well-nourished. No distress.   HENT:   Head: Normocephalic and atraumatic.   Nose: Nose normal.   Mouth/Throat: Oropharynx is clear and moist.   Eyes: EOM are normal. Pupils are equal, round, and reactive to light. No scleral icterus.   Neck: Normal range of motion. Neck supple.   Cardiovascular: Normal rate, regular rhythm, normal heart sounds and intact distal pulses.    No murmur heard.  Pulmonary/Chest: Effort normal and breath sounds normal. No respiratory distress. He has no wheezes. He has no rales. He exhibits no tenderness.   Abdominal: Soft. Bowel sounds are normal. There is no tenderness.   No splenic tenderness.   Musculoskeletal: Normal range of motion. He exhibits tenderness (tender to palpation over the left anterior lateral lower ribs.  No crepitus.  No bruising.). He exhibits no edema.   Neurological: He is alert and oriented to person, place, and time.   Skin: Skin is warm and dry. No rash noted. He is not diaphoretic.   Psychiatric: He has a normal  mood and affect.   Nursing note and vitals reviewed.      Procedures         ED Course  ED Course    No acute findings on chest x-ray.  The exam is consistent with a left lower rib contusion.  The patient will be discharged home to take Motrin or Aleve.  He has been advised to follow-up with his PCP or to return to the emergency department if he has worsening symptoms.              MDM    Final diagnoses:   Rib contusion, left, initial encounter            MIL Ashraf  07/07/17 5701

## 2017-07-07 NOTE — ED PROVIDER NOTES
Subjective   HPI Comments: 52-year-old male here with severe, sharp/crampy infraumbilical abdominal pain since early this morning with nausea.  No vomiting or diarrhea.  No urinary complaints.  Thinks that he may have been sick after eating some partially cooked hamburger last night as his wife got sick with similar complaints.  No dysuria or hematuria.  No fever.  He is a gastric bypass patient.  Aggravating factors: Palpation of abdomen.  Alleviating factors: None.  Treatment prior to arrival: None.      History provided by:  Patient  History limited by: Nothing.   used: No        Review of Systems   Constitutional: Negative.    HENT: Negative.    Eyes: Negative.    Respiratory: Negative.    Cardiovascular: Negative.  Negative for chest pain.   Gastrointestinal: Positive for abdominal pain and nausea.   Endocrine: Negative.    Genitourinary: Negative for dysuria.   Musculoskeletal: Negative.    Skin: Negative.    Allergic/Immunologic: Negative.    Neurological: Negative.    Hematological: Negative.    Psychiatric/Behavioral: Negative.    All other systems reviewed and are negative.      Past Medical History:   Diagnosis Date   • Renal calculi    • Renal disorder    • Rheumatoid arthritis        Allergies   Allergen Reactions   • Ciprofloxacin Other (See Comments)     Stated family allergy that almost caused death with siblings and mother   • Vancomycin Hives       Past Surgical History:   Procedure Laterality Date   • CHOLECYSTECTOMY     • EYE MUSCLE SURGERY Bilateral     x3   • GASTRIC BYPASS     • KNEE ARTHROPLASTY Bilateral    • TONSILLECTOMY     • VENTRICULAR ATRIAL SHUNT         History reviewed. No pertinent family history.    Social History     Social History   • Marital status:      Spouse name: N/A   • Number of children: N/A   • Years of education: N/A     Social History Main Topics   • Smoking status: Former Smoker   • Smokeless tobacco: None   • Alcohol use No   • Drug use:  No   • Sexual activity: Not Asked     Other Topics Concern   • None     Social History Narrative           Objective   Physical Exam   Constitutional: He is oriented to person, place, and time. He appears well-developed and well-nourished. No distress.   HENT:   Head: Normocephalic and atraumatic.   Right Ear: External ear normal.   Left Ear: External ear normal.   Eyes: EOM are normal. Pupils are equal, round, and reactive to light.   Neck: Normal range of motion. Neck supple.   Cardiovascular: Normal rate, regular rhythm and normal heart sounds.    Pulmonary/Chest: Effort normal and breath sounds normal. No stridor. He has no wheezes. He exhibits no tenderness.   Abdominal: Soft. He exhibits no distension. There is tenderness. There is no rebound and no guarding.   The patient is tender in the infraumbilical region.  No rebound or guarding.   Musculoskeletal: Normal range of motion. He exhibits no edema.   Neurological: He is alert and oriented to person, place, and time.   Skin: Skin is warm and dry. No rash noted. He is not diaphoretic.   Psychiatric: He has a normal mood and affect. His behavior is normal. Judgment and thought content normal.   Nursing note and vitals reviewed.      Procedures         ED Course  ED Course                  MDM  Number of Diagnoses or Management Options  Abdominal pain, unspecified location: new and requires workup  Non-intractable vomiting with nausea, unspecified vomiting type: new and requires workup     Amount and/or Complexity of Data Reviewed  Clinical lab tests: ordered and reviewed  Tests in the radiology section of CPT®: ordered and reviewed    Risk of Complications, Morbidity, and/or Mortality  Presenting problems: moderate  Diagnostic procedures: low  Management options: moderate    Patient Progress  Patient progress: stable      Final diagnoses:   Abdominal pain, unspecified location   Non-intractable vomiting with nausea, unspecified vomiting type            Ayse MAXWELL  NUSRAT Henry  07/07/17 1322

## 2017-07-19 ENCOUNTER — HOSPITAL ENCOUNTER (OUTPATIENT)
Dept: INFUSION THERAPY | Facility: HOSPITAL | Age: 52
Setting detail: INFUSION SERIES
Discharge: HOME | End: 2017-07-19

## 2017-07-19 VITALS
DIASTOLIC BLOOD PRESSURE: 67 MMHG | WEIGHT: 260 LBS | BODY MASS INDEX: 38.51 KG/M2 | TEMPERATURE: 97 F | OXYGEN SATURATION: 97 % | HEIGHT: 69 IN | HEART RATE: 71 BPM | SYSTOLIC BLOOD PRESSURE: 98 MMHG | RESPIRATION RATE: 18 BRPM

## 2017-07-19 DIAGNOSIS — M31.6 GIANT CELL ARTERITIS (HCC): ICD-10-CM

## 2017-07-19 DIAGNOSIS — M06.4 INFLAMMATORY POLYARTHROPATHY (HCC): ICD-10-CM

## 2017-07-19 DIAGNOSIS — M15.0 PRIMARY GENERALIZED (OSTEO)ARTHRITIS: ICD-10-CM

## 2017-07-19 DIAGNOSIS — I89.0 LYMPHEDEMA: ICD-10-CM

## 2017-07-19 PROCEDURE — 25010000002 INFLIXIMAB PER 10 MG: Performed by: INTERNAL MEDICINE

## 2017-07-19 PROCEDURE — 63710000001 DIPHENHYDRAMINE PER 50 MG: Performed by: INTERNAL MEDICINE

## 2017-07-19 PROCEDURE — 96415 CHEMO IV INFUSION ADDL HR: CPT

## 2017-07-19 PROCEDURE — 96413 CHEMO IV INFUSION 1 HR: CPT

## 2017-07-19 RX ORDER — SODIUM CHLORIDE 9 MG/ML
250 INJECTION, SOLUTION INTRAVENOUS ONCE
Status: COMPLETED | OUTPATIENT
Start: 2017-07-19 | End: 2017-07-19

## 2017-07-19 RX ORDER — DIPHENHYDRAMINE HCL 25 MG
25 CAPSULE ORAL ONCE
OUTPATIENT
Start: 2017-07-19

## 2017-07-19 RX ORDER — SODIUM CHLORIDE 9 MG/ML
250 INJECTION, SOLUTION INTRAVENOUS ONCE
OUTPATIENT
Start: 2017-07-19

## 2017-07-19 RX ORDER — ACETAMINOPHEN 500 MG
1000 TABLET ORAL ONCE
Status: COMPLETED | OUTPATIENT
Start: 2017-07-19 | End: 2017-07-19

## 2017-07-19 RX ORDER — DIPHENHYDRAMINE HCL 25 MG
25 CAPSULE ORAL ONCE
Status: COMPLETED | OUTPATIENT
Start: 2017-07-19 | End: 2017-07-19

## 2017-07-19 RX ORDER — ACETAMINOPHEN 500 MG
1000 TABLET ORAL ONCE
OUTPATIENT
Start: 2017-07-19

## 2017-07-19 RX ADMIN — DIPHENHYDRAMINE HYDROCHLORIDE 25 MG: 25 CAPSULE ORAL at 09:17

## 2017-07-19 RX ADMIN — SODIUM CHLORIDE 250 ML: 9 INJECTION, SOLUTION INTRAVENOUS at 09:18

## 2017-07-19 RX ADMIN — INFLIXIMAB 400 MG: 100 INJECTION, POWDER, LYOPHILIZED, FOR SOLUTION INTRAVENOUS at 09:45

## 2017-07-19 RX ADMIN — ACETAMINOPHEN 1000 MG: 500 TABLET, FILM COATED ORAL at 09:17

## 2017-09-13 ENCOUNTER — APPOINTMENT (OUTPATIENT)
Dept: INFUSION THERAPY | Facility: HOSPITAL | Age: 52
End: 2017-09-13